# Patient Record
(demographics unavailable — no encounter records)

---

## 2025-01-21 NOTE — ASSESSMENT
[FreeTextEntry1] : CAD - stable and cont meds.  Will ask Dr Packer about duration of DAPT, and whether to continue with ASA or Brillinta when it is no longer indicated.

## 2025-01-21 NOTE — REASON FOR VISIT
[Coronary Artery Disease] : coronary artery disease [FreeTextEntry1] : Feeling good - no angina.  LDL was 47 in Aug.  Remains on DAPT.

## 2025-07-08 NOTE — PHYSICAL EXAM
[Well Developed] : well developed [Well Nourished] : well nourished [No Acute Distress] : no acute distress [Normal Conjunctiva] : normal conjunctiva [Normal Venous Pressure] : normal venous pressure [No Carotid Bruit] : no carotid bruit [Normal S1, S2] : normal S1, S2 [No Murmur] : no murmur [No Rub] : no rub [No Gallop] : no gallop [Clear Lung Fields] : clear lung fields [Good Air Entry] : good air entry [No Respiratory Distress] : no respiratory distress  [Soft] : abdomen soft [Non Tender] : non-tender [No Masses/organomegaly] : no masses/organomegaly [Normal Bowel Sounds] : normal bowel sounds [Normal Gait] : normal gait [No Edema] : no edema [No Cyanosis] : no cyanosis [No Clubbing] : no clubbing [No Varicosities] : no varicosities [No Skin Lesions] : no skin lesions [Moves all extremities] : moves all extremities [No Focal Deficits] : no focal deficits [Normal Speech] : normal speech [Alert and Oriented] : alert and oriented [Normal memory] : normal memory [de-identified] : mild exfoliative dermatitis palms of hands

## 2025-07-08 NOTE — ASSESSMENT
[FreeTextEntry1] : CAD stable HPL stable and cont meds Skin may be SE of the Brilinta which was stopped and will continue on ASA.  If skin issue does not resolve she will see dermatologist

## 2025-07-08 NOTE — REASON FOR VISIT
[Coronary Artery Disease] : coronary artery disease [Other: ____] : [unfilled] [FreeTextEntry1] : Feels find cardiac-wise.  However, last week developed exfoliating skin condition mostly on hands.  I dc'd Brilinta and started her on ASA.  Skin seems somewhat better.  EKG is normal